# Patient Record
Sex: MALE | Race: WHITE
[De-identification: names, ages, dates, MRNs, and addresses within clinical notes are randomized per-mention and may not be internally consistent; named-entity substitution may affect disease eponyms.]

---

## 2019-01-04 ENCOUNTER — HOSPITAL ENCOUNTER (EMERGENCY)
Dept: HOSPITAL 91 - E/R | Age: 39
Discharge: HOME | End: 2019-01-04
Payer: COMMERCIAL

## 2019-01-04 ENCOUNTER — HOSPITAL ENCOUNTER (EMERGENCY)
Dept: HOSPITAL 10 - E/R | Age: 39
Discharge: HOME | End: 2019-01-04
Payer: COMMERCIAL

## 2019-01-04 VITALS
BODY MASS INDEX: 25.25 KG/M2 | WEIGHT: 180.38 LBS | HEIGHT: 71 IN | BODY MASS INDEX: 25.25 KG/M2 | WEIGHT: 180.38 LBS | HEIGHT: 71 IN

## 2019-01-04 VITALS — DIASTOLIC BLOOD PRESSURE: 96 MMHG | RESPIRATION RATE: 20 BRPM | HEART RATE: 88 BPM | SYSTOLIC BLOOD PRESSURE: 156 MMHG

## 2019-01-04 DIAGNOSIS — S53.125A: Primary | ICD-10-CM

## 2019-01-04 DIAGNOSIS — S52.202A: ICD-10-CM
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
## 2019-01-04 PROCEDURE — 96376 TX/PRO/DX INJ SAME DRUG ADON: CPT

## 2019-01-04 PROCEDURE — 24600 TX CLSD ELBOW DISLC W/O ANES: CPT

## 2019-01-04 PROCEDURE — 73080 X-RAY EXAM OF ELBOW: CPT

## 2019-01-04 PROCEDURE — 94770: CPT

## 2019-01-04 PROCEDURE — 96375 TX/PRO/DX INJ NEW DRUG ADDON: CPT

## 2019-01-04 PROCEDURE — 96374 THER/PROPH/DIAG INJ IV PUSH: CPT

## 2019-01-04 PROCEDURE — 99285 EMERGENCY DEPT VISIT HI MDM: CPT

## 2019-01-04 RX ADMIN — PROPOFOL 1 MG: 10 INJECTION, EMULSION INTRAVENOUS at 21:18

## 2019-01-04 RX ADMIN — ONDANSETRON HYDROCHLORIDE 1 MG: 2 INJECTION, SOLUTION INTRAMUSCULAR; INTRAVENOUS at 19:55

## 2019-01-04 RX ADMIN — THIAMINE HYDROCHLORIDE 1 MLS/HR: 100 INJECTION, SOLUTION INTRAMUSCULAR; INTRAVENOUS at 20:14

## 2019-01-04 NOTE — ERD
ER Documentation


Chief Complaint


Chief Complaint





L elbow injury today from slipping on the floor,on crutches x recent injury





HPI


This is a 38-year-old male who presents to the emergency room for evaluation of 


left elbow pain.  The patient states that he was using his crutches for his 


previous left lower extremity injury and he slipped on the wet floor and fell 


backwards and hit his elbow.  Patient states he is having pain in the elbow and 


describes as a sharp pain worse with any movement.  He denies any numbness or 


tingling in the arm or in the hand.  He denies any head injury or loss of 


consciousness, denies being on blood thinners at this time.





ROS


All systems reviewed and are negative except as per history of present illness.





Allergies


Allergies:  


Coded Allergies:  


     No Known Allergy (Unverified , 1/4/19)





PMhx/Soc


Medical and Surgical Hx:  pt denies Medical Hx, pt denies Surgical Hx


Hx Miscellaneous Medical Probl:  Yes (LEFT LEG FX )


Hx Alcohol Use:  No


Hx Substance Use:  No


Hx Tobacco Use:  No


Smoking Status:  Never smoker





Physical Exam


Vitals





Vital Signs


  Date      Temp  Pulse  Resp  B/P (MAP)   Pulse Ox  O2          O2 Flow    FiO2


Time                                                 Delivery    Rate


    1/4/19                 20      151/99       100  Room Air


     21:52                          (116)


    1/4/19                                      100                    3.0


     20:45


    1/4/19  99.2     82    18      139/82        97  Room Air


     19:51                          (101)


    1/4/19  99.2     99    18      149/93        97


     19:26                          (111)





Physical Exam


INITIAL VITAL SIGNS: Reviewed by me


GENERAL:  The patient is well developed and appropriate for usual state of 


health in no apparent distress


HEENT: Pupils equal, round, and reactive to light.  EOMI. There is no scleral 


icterus.


NECK:  C-spine is soft and supple, there is no meningismus.  There is no 


cervical lymphadenopathy.


LUNGS:  Clear to auscultation bilaterally. There are no rales, wheezes or 


rhonchi.


HEART:  Regular rate and rhythm, no murmurs, clicks, rubs or gallops.


ABDOMEN:  Soft, non-tender, non-distended.  There are bowel sounds in all four 


quadrants. No rebound or guarding.


EXTREMITIES: Visible deformity and soft tissue swelling noted at the left elbow,


there is no peripheral cyanosis or edema.  No focal  erythema.


NEUROLOGICAL:  The patient moves all four extremities with 5/5 strength.  


Cranial nerves II - XII are intact. Normal gait. Alert and oriented


SKIN:  There is no apparent rash or petechiae.


HEME/LYMPHATIC:  There is no evidence of excessive bruising or lymphedema.


PSYCHIATRIC:  The patient does not appear anxious or depressed.


Results 24 hrs





Current Medications


 Medications
   Dose
          Sig/Bonita
       Start Time
   Status  Last


 (Trade)       Ordered        Route
 PRN     Stop Time              Admin
Dose


                              Reason                                Admin


 Morphine       4 mg           ONCE  STAT
    1/4/19        DC            1/4/19


Sulfate
                      IV
            19:48
 1/4/19                19:56



(morphine)                                   19:49


 Ondansetron    4 mg           ONCE  STAT
    1/4/19        DC            1/4/19


HCl
  (Zofran                 IV
            19:48
 1/4/19                19:55



Inj)                                         19:49


 Propofol
      100 mg         ONCE  ONCE
    1/4/19        DC       



(Diprivan)                    IV
            20:30
 1/4/19


                                             20:31


 Sodium         1,000 ml @ 
   Q1H STAT
      1/4/19        DC            1/4/19


Chloride       1,000 mls/hr   IV
            20:14
 1/4/19                20:14



                                             21:13


 Morphine       4 mg           ONCE  STAT
    1/4/19        DC            1/4/19


Sulfate
                      IV
            20:56
 1/4/19                21:38



(morphine)                                   20:57








Procedures/MDM


X-ray Elbow 3V Interpreted by me: 


1. Posterior dislocation of the radial capitellar and ulnohumeral joints, with 


multiple fracture fragments seen in the region of the joint. 


2. A post reduction CT examination would be of further use.    


X-ray Elbow 3V Interpreted by me: #2


1.  Interval reduction of the posterior elbow dislocation and reduction of the 


radial head dislocation.


2.  Avulsion fragments again project anterior and posterior to the elbow joint.


3.  A small spur off the posterior olecranon is again evident.


4.  The elbow is been immobilized in a posterior fiberglass splint.





Procedural Sedation: Pre-assessment performed.  See preceding complete history 


and physical for details. Time out performed.  See sedation documentation for 


details.


   Medication(s):     150 mg propofol


   Complications:     No hypoxic or apneic events


Recovered without incident.  Greater than 15 minutes of face to face time 


included in sedation and recovery.





Reduction by me:


Anesthesia:     Propofol 150 mg


Location:      Left elbow


Technique:     Gentle traction and manipulation


Results:          Restoration of normal anatomic positioning





Neurovascularly intact post procedure.





[Splint Assessment: Neurovascularly intact post splint placement with good fit.]





This 38-year-old male presents to the ER for evaluation of left elbow pain.  On 


my exam I did note a visibly deformed and dislocated left elbow.  X-rays do 


reveal suspicion of posterior elbow dislocation.  The patient did have a 


conscious sedation done with realignment of his elbow.  Please see procedure 


note.  The patient remains neurovascularly intact before and after reduction.  


He was given copies of his x-rays, and does state he has an orthopedic surgeon 


for his previous left lower extremity injury and is scheduled to see this person


in 1 week.  The patient will be discharged home with a prescription for Motrin 


at this time and was given strict return precautions.





Departure


Diagnosis:  


   Primary Impression:  


   Posterior dislocation of left elbow


   Additional Impression:  


   Ulna fracture


Condition:  Stable











NICK HERNANDEZ DO               Jan 4, 2019 22:00

## 2019-06-30 ENCOUNTER — HOSPITAL ENCOUNTER (EMERGENCY)
Dept: HOSPITAL 10 - FTE | Age: 39
LOS: 1 days | Discharge: HOME | End: 2019-07-01
Payer: COMMERCIAL

## 2019-06-30 ENCOUNTER — HOSPITAL ENCOUNTER (EMERGENCY)
Dept: HOSPITAL 91 - FTE | Age: 39
LOS: 1 days | Discharge: HOME | End: 2019-07-01
Payer: COMMERCIAL

## 2019-06-30 VITALS
BODY MASS INDEX: 25.19 KG/M2 | WEIGHT: 179.9 LBS | BODY MASS INDEX: 25.19 KG/M2 | HEIGHT: 71 IN | HEIGHT: 71 IN | WEIGHT: 179.9 LBS

## 2019-06-30 VITALS — DIASTOLIC BLOOD PRESSURE: 93 MMHG | SYSTOLIC BLOOD PRESSURE: 156 MMHG | RESPIRATION RATE: 18 BRPM | HEART RATE: 89 BPM

## 2019-06-30 DIAGNOSIS — M25.562: Primary | ICD-10-CM

## 2019-06-30 PROCEDURE — 99283 EMERGENCY DEPT VISIT LOW MDM: CPT

## 2019-07-01 RX ADMIN — IBUPROFEN 1 MG: 600 TABLET ORAL at 01:38

## 2019-07-01 NOTE — ERD
ER Documentation


Chief Complaint


Chief Complaint





L KNEE PAIN X'S 3 DAYS S/P STRAINING KNEE





HPI


38-year-old male with no significant past medical history presents to the 


emergency department complaining of twisting his left knee 3 days ago while 


running.  He reports pain with walking which is rated 7/10 in severity and 


intermittent.  He reports swelling in the left knee which is improving overall. 


He used ice at home with some relief but took no medication.  He denies any 


falls, loss of consciousness, or head injury or other symptoms or injuries at 


this time.





ROS


All systems reviewed and are negative except as per history of present illness.





Medications


Home Meds


Active Scripts


Ibuprofen* (Motrin*) 800 Mg Tab, 800 MG PO Q6, #30 TAB


   Prov:BEATRICE HIGHTOWER PA-C         7/1/19


Hydrocodone/Acetaminophen (Norco 5-325 Tablet) 1 Each Tablet, 1 TAB PO Q6H PRN 


for PAIN, #8 TAB


   Prov:NICK HERNANDEZ DO         1/4/19


Ibuprofen* (Motrin*) 800 Mg Tab, 800 MG PO Q6H PRN for PAIN AND OR ELEVATED T


EMP, #30 TAB


   Prov:CLAUDEINICK DO         1/4/19





Allergies


Allergies:  


Coded Allergies:  


     No Known Allergy (Unverified , 1/4/19)





PMhx/Soc


Medical and Surgical Hx:  pt denies Surgical Hx


Hx Miscellaneous Medical Probl:  Yes (LEFT LEG FX )


Hx Alcohol Use:  No


Hx Substance Use:  No


Hx Tobacco Use:  No


Smoking Status:  Never smoker





FmHx


Family History:  No diabetes





Physical Exam


Vitals





Vital Signs


  Date      Temp  Pulse  Resp  B/P (MAP)   Pulse Ox  O2          O2 Flow    FiO2


Time                                                 Delivery    Rate


   6/30/19  98.6     89    18      156/93        99


     22:12                          (114)





Physical Exam


Const:   No acute distress


Head:   Atraumatic 


Eyes:    Normal Conjunctiva


ENT:    Normal External Ears, Nose and Mouth.


Neck:               Full range of motion. No meningismus.


Resp:   No respiratory distress.


Skin:   No petechiae or rashes


Ext:    Mild effusion noted over the left knee with tenderness palpation over 


the anterior aspect.  Limited range of motion secondary to pain.


Neur:   Awake and alert


Psych:    Normal Mood and Affect


Results 24 hrs





Current Medications


 Medications
   Dose
          Sig/Bonita
       Start Time
   Status  Last


 (Trade)       Ordered        Route
 PRN     Stop Time              Admin
Dose


                              Reason                                Admin


 Ibuprofen
     600 mg         ONCE  ONCE
    7/1/19        DC       



(Motrin)                      PO
            02:00
 7/1/19


                                             02:00








Procedures/MDM


38-year-old male presenting with complaints of left knee pain likely secondary 


to ligamental injury.  X-ray was ordered although patient refused to have it 


completed.  He states he wants to be discharged home with close orthopedic 


follow-up for MRI order.  Patient was placed in knee immobilizer of the left 


lower extremity and was neurovascularly intact postplacement.  He was 


administered ibuprofen for pain.  He was otherwise stable for discharge and 


advised to return immediately for any new or worsening or concerning symptoms.  


He understands and agrees with diagnosis and plan and return precautions.





Patient's blood pressure was elevated (>120/80) but appears stable without 


evidence of hypertension emergency or urgency.  The patient is to follow-up and 


pursue outpatient monitoring and therapy with their primary care physician 


within 1 week and return immediately if they have any new, worsening, or 


concerning symptoms.





Departure


Diagnosis:  


   Primary Impression:  


   Left knee pain


Condition:  Fair


Patient Instructions:  Knee Pain, Meniscus Injury (Possible)


Referrals:  


[facility]


YOU HAVE RECEIVED A MEDICAL SCREENING EXAM AND THE RESULTS INDICATE THAT YOU DO 


NOT HAVE A CONDITION THAT REQUIRES URGENT TREATMENT IN THE EMERGENCY DEPARTMENT.





FURTHER EVALUATION AND TREATMENT OF YOUR CONDITION CAN WAIT UNTIL YOU ARE SEEN 


IN YOUR DOCTORS OFFICE WITHIN THE NEXT 1-2 DAYS. IT IS YOUR RESPONSIBILITY TO 


MAKE AN APPOINTMENT FOR FOLOW-UP CARE.





IF YOU HAVE A PRIMARY DOCTOR


--you should call your primary doctor and schedule an appointment





IF YOU DO NOT HAVE A PRIMARY DOCTOR YOU CAN CALL OUR PHYSICIAN REFERRAL HOTLINE 


AT


 [phone] 





IF YOU CAN NOT AFFORD TO SEE A PHYSICIAN YOU CAN CHOSE FROM THE FOLLOWING 


[facility]





[facility] [phone] [address] [facility]. [facility] [phone] [address] [facility]. [facility] [phone] [address] [facility]. [facility] [phone] [address] [facility] [phone] [address] [facility]. [facility]. [phone] [address] [facility]


Urgent Care





7 a.m.- 11 p.m.


Every Day of the Week





NO APPOINTMENT OR AUTHORIZATION NEEDED


[phone]





[facility]


Hours: Mon-Fri


9:00 AM - 5:00 PM





Additional Instructions:  


SPECIALIST:  YOU HAVE A MEDICAL CONDITION WHICH REQUIRES YOU TO SEE A   


SPECIALIST WITHIN THE NEXT 1-2 DAYS. PLEASE FOLLOW UP WITH YOUR PRIMARY 


PHYSICIAN FOR REFFERAL.IF YOU DO NOT HAVE A PRIMARY CARE PHYSICIAN AND/OR YOU 


CAN NOT AFFORD TO SEE A PHYSICIAN THE FOLLOWING RESOURCES HAVE BEEN SUPPLIED TO 


YOU. IT IS YOUR RESPONSIBILITY TO BE SEEN BY THE SPECIALIST: ORTHOPEDICS











BEATRICE HIGHTOWER PA-C        Jul 1, 2019 04:40